# Patient Record
Sex: FEMALE | Race: WHITE | Employment: OTHER | URBAN - METROPOLITAN AREA
[De-identification: names, ages, dates, MRNs, and addresses within clinical notes are randomized per-mention and may not be internally consistent; named-entity substitution may affect disease eponyms.]

---

## 2021-07-21 PROBLEM — G47.09 OTHER INSOMNIA: Status: ACTIVE | Noted: 2021-07-21

## 2021-08-13 PROBLEM — E78.00 HYPERCHOLESTEREMIA: Status: ACTIVE | Noted: 2021-08-13

## 2021-08-25 ENCOUNTER — HOSPITAL ENCOUNTER (OUTPATIENT)
Dept: LAB | Age: 71
Discharge: HOME | End: 2021-08-25
Payer: MEDICARE

## 2021-08-25 DIAGNOSIS — D64.9 ANEMIA, UNSPECIFIED TYPE: ICD-10-CM

## 2021-08-25 PROBLEM — D50.8 IRON DEFICIENCY ANEMIA SECONDARY TO INADEQUATE DIETARY IRON INTAKE: Status: ACTIVE | Noted: 2021-08-25

## 2021-08-25 LAB
ALBUMIN SERPL-MCNC: 4 G/DL (ref 3.2–4.6)
ALBUMIN/GLOB SERPL: 1.1 {RATIO} (ref 1.2–3.5)
ALP SERPL-CCNC: 64 U/L (ref 50–136)
ALT SERPL-CCNC: 17 U/L (ref 12–65)
ANION GAP SERPL CALC-SCNC: 8 MMOL/L (ref 7–16)
AST SERPL-CCNC: 12 U/L (ref 15–37)
BASOPHILS # BLD: 0.1 K/UL (ref 0–0.2)
BASOPHILS NFR BLD: 2 % (ref 0–2)
BILIRUB SERPL-MCNC: 0.4 MG/DL (ref 0.2–1.1)
BUN SERPL-MCNC: 10 MG/DL (ref 8–23)
CALCIUM SERPL-MCNC: 9.1 MG/DL (ref 8.3–10.4)
CHLORIDE SERPL-SCNC: 106 MMOL/L (ref 98–107)
CO2 SERPL-SCNC: 25 MMOL/L (ref 21–32)
CREAT SERPL-MCNC: 0.7 MG/DL (ref 0.6–1)
DIFFERENTIAL METHOD BLD: ABNORMAL
EOSINOPHIL # BLD: 0.1 K/UL (ref 0–0.8)
EOSINOPHIL NFR BLD: 1 % (ref 0.5–7.8)
ERYTHROCYTE [DISTWIDTH] IN BLOOD BY AUTOMATED COUNT: 15.4 % (ref 11.9–14.6)
FERRITIN SERPL-MCNC: 5 NG/ML (ref 8–388)
FOLATE SERPL-MCNC: 4.8 NG/ML (ref 3.1–17.5)
GLOBULIN SER CALC-MCNC: 3.5 G/DL (ref 2.3–3.5)
GLUCOSE SERPL-MCNC: 101 MG/DL (ref 65–100)
HCT VFR BLD AUTO: 38.2 % (ref 35.8–46.3)
HGB BLD-MCNC: 11.6 G/DL (ref 11.7–15.4)
HGB RETIC QN AUTO: 27 PG (ref 29–35)
IMM GRANULOCYTES # BLD AUTO: 0 K/UL (ref 0–0.5)
IMM GRANULOCYTES NFR BLD AUTO: 0 % (ref 0–5)
IMM RETICS NFR: 16.9 % (ref 3–15.9)
IRON SATN MFR SERPL: 5 %
IRON SERPL-MCNC: 22 UG/DL (ref 35–150)
LYMPHOCYTES # BLD: 1.2 K/UL (ref 0.5–4.6)
LYMPHOCYTES NFR BLD: 16 % (ref 13–44)
MCH RBC QN AUTO: 23.1 PG (ref 26.1–32.9)
MCHC RBC AUTO-ENTMCNC: 30.4 G/DL (ref 31.4–35)
MCV RBC AUTO: 76.1 FL (ref 79.6–97.8)
MONOCYTES # BLD: 0.6 K/UL (ref 0.1–1.3)
MONOCYTES NFR BLD: 8 % (ref 4–12)
NEUTS SEG # BLD: 5.3 K/UL (ref 1.7–8.2)
NEUTS SEG NFR BLD: 72 % (ref 43–78)
NRBC # BLD: 0 K/UL (ref 0–0.2)
PLATELET # BLD AUTO: 327 K/UL (ref 150–450)
PMV BLD AUTO: 9 FL (ref 9.4–12.3)
POTASSIUM SERPL-SCNC: 3.7 MMOL/L (ref 3.5–5.1)
PROT SERPL-MCNC: 7.5 G/DL (ref 6.3–8.2)
RBC # BLD AUTO: 5.02 M/UL (ref 4.05–5.2)
RETICS # AUTO: 0.06 M/UL (ref 0.03–0.1)
RETICS/RBC NFR AUTO: 1.3 % (ref 0.3–2)
SODIUM SERPL-SCNC: 139 MMOL/L (ref 136–145)
TIBC SERPL-MCNC: 446 UG/DL (ref 250–450)
VIT B12 SERPL-MCNC: 594 PG/ML (ref 193–986)
WBC # BLD AUTO: 7.3 K/UL (ref 4.3–11.1)

## 2021-08-25 PROCEDURE — 82728 ASSAY OF FERRITIN: CPT

## 2021-08-25 PROCEDURE — 85046 RETICYTE/HGB CONCENTRATE: CPT

## 2021-08-25 PROCEDURE — 82746 ASSAY OF FOLIC ACID SERUM: CPT

## 2021-08-25 PROCEDURE — 82607 VITAMIN B-12: CPT

## 2021-08-25 PROCEDURE — 80053 COMPREHEN METABOLIC PANEL: CPT

## 2021-08-25 PROCEDURE — 83540 ASSAY OF IRON: CPT

## 2021-08-25 PROCEDURE — 85025 COMPLETE CBC W/AUTO DIFF WBC: CPT

## 2021-08-25 PROCEDURE — 36415 COLL VENOUS BLD VENIPUNCTURE: CPT

## 2021-09-02 ENCOUNTER — APPOINTMENT (OUTPATIENT)
Dept: INFUSION THERAPY | Age: 71
End: 2021-09-02

## 2021-09-20 ENCOUNTER — HOSPITAL ENCOUNTER (OUTPATIENT)
Dept: INFUSION THERAPY | Age: 71
Discharge: HOME | End: 2021-09-20
Payer: MEDICARE

## 2021-09-20 VITALS
TEMPERATURE: 97.4 F | DIASTOLIC BLOOD PRESSURE: 89 MMHG | OXYGEN SATURATION: 94 % | SYSTOLIC BLOOD PRESSURE: 136 MMHG | RESPIRATION RATE: 18 BRPM | HEART RATE: 97 BPM

## 2021-09-20 DIAGNOSIS — D50.8 IRON DEFICIENCY ANEMIA SECONDARY TO INADEQUATE DIETARY IRON INTAKE: Primary | ICD-10-CM

## 2021-09-20 PROCEDURE — 74011000258 HC RX REV CODE- 258: Performed by: INTERNAL MEDICINE

## 2021-09-20 PROCEDURE — 74011250636 HC RX REV CODE- 250/636: Performed by: INTERNAL MEDICINE

## 2021-09-20 PROCEDURE — 96361 HYDRATE IV INFUSION ADD-ON: CPT

## 2021-09-20 PROCEDURE — 96374 THER/PROPH/DIAG INJ IV PUSH: CPT

## 2021-09-20 RX ORDER — SODIUM CHLORIDE 0.9 % (FLUSH) 0.9 %
10 SYRINGE (ML) INJECTION AS NEEDED
Status: ACTIVE | OUTPATIENT
Start: 2021-09-20 | End: 2021-09-20

## 2021-09-20 RX ADMIN — FERUMOXYTOL 510 MG: 510 INJECTION INTRAVENOUS at 10:38

## 2021-09-20 RX ADMIN — Medication 10 ML: at 10:37

## 2021-09-20 RX ADMIN — SODIUM CHLORIDE 500 ML: 900 INJECTION, SOLUTION INTRAVENOUS at 10:53

## 2021-09-20 NOTE — PROGRESS NOTES
Pt arrive ambulatory, fereheme completed, monitored for 30 min, no adverse side effects, discharged home ambulatory

## 2021-10-11 NOTE — ADDENDUM NOTE
Encounter addended by: Jojo Romero RN on: 10/11/2021 9:26 AM   Actions taken: MAR administration accepted, Charge Capture section accepted

## 2022-03-18 PROBLEM — E78.00 HYPERCHOLESTEREMIA: Status: ACTIVE | Noted: 2021-08-13

## 2022-03-19 PROBLEM — G47.09 OTHER INSOMNIA: Status: ACTIVE | Noted: 2021-07-21

## 2022-03-19 PROBLEM — D50.8 IRON DEFICIENCY ANEMIA SECONDARY TO INADEQUATE DIETARY IRON INTAKE: Status: ACTIVE | Noted: 2021-08-25
